# Patient Record
Sex: MALE | Race: WHITE
[De-identification: names, ages, dates, MRNs, and addresses within clinical notes are randomized per-mention and may not be internally consistent; named-entity substitution may affect disease eponyms.]

---

## 2018-01-12 ENCOUNTER — HOSPITAL ENCOUNTER (EMERGENCY)
Dept: HOSPITAL 45 - C.EDB | Age: 53
LOS: 1 days | Discharge: HOME | End: 2018-01-13
Payer: COMMERCIAL

## 2018-01-12 VITALS
HEIGHT: 72.01 IN | HEIGHT: 72.01 IN | BODY MASS INDEX: 34.73 KG/M2 | BODY MASS INDEX: 34.73 KG/M2 | WEIGHT: 256.4 LBS | WEIGHT: 256.4 LBS

## 2018-01-12 VITALS — TEMPERATURE: 97.88 F

## 2018-01-12 DIAGNOSIS — Z79.01: ICD-10-CM

## 2018-01-12 DIAGNOSIS — I82.432: Primary | ICD-10-CM

## 2018-01-12 DIAGNOSIS — Z79.899: ICD-10-CM

## 2018-01-13 VITALS — SYSTOLIC BLOOD PRESSURE: 132 MMHG | OXYGEN SATURATION: 97 % | HEART RATE: 78 BPM | DIASTOLIC BLOOD PRESSURE: 84 MMHG

## 2018-01-13 LAB
ALBUMIN SERPL-MCNC: 3.5 GM/DL (ref 3.4–5)
ALP SERPL-CCNC: 44 U/L (ref 45–117)
ALT SERPL-CCNC: 16 U/L (ref 12–78)
AST SERPL-CCNC: 12 U/L (ref 15–37)
BASOPHILS # BLD: 0.01 K/UL (ref 0–0.2)
BASOPHILS NFR BLD: 0.1 %
BUN SERPL-MCNC: 16 MG/DL (ref 7–18)
CALCIUM SERPL-MCNC: 8.8 MG/DL (ref 8.5–10.1)
CO2 SERPL-SCNC: 28 MMOL/L (ref 21–32)
CREAT SERPL-MCNC: 0.93 MG/DL (ref 0.6–1.4)
EOS ABS #: 0.1 K/UL (ref 0–0.5)
EOSINOPHIL NFR BLD AUTO: 179 K/UL (ref 130–400)
GLUCOSE SERPL-MCNC: 90 MG/DL (ref 70–99)
HCT VFR BLD CALC: 41.3 % (ref 42–52)
HGB BLD-MCNC: 14 G/DL (ref 14–18)
IG#: 0.02 K/UL (ref 0–0.02)
IMM GRANULOCYTES NFR BLD AUTO: 31.6 %
INR PPP: 2.3 (ref 0.9–1.1)
LYMPHOCYTES # BLD: 2.67 K/UL (ref 1.2–3.4)
MCH RBC QN AUTO: 31 PG (ref 25–34)
MCHC RBC AUTO-ENTMCNC: 33.9 G/DL (ref 32–36)
MCV RBC AUTO: 91.4 FL (ref 80–100)
MONO ABS #: 0.74 K/UL (ref 0.11–0.59)
MONOCYTES NFR BLD: 8.7 %
NEUT ABS #: 4.92 K/UL (ref 1.4–6.5)
NEUTROPHILS # BLD AUTO: 1.2 %
NEUTROPHILS NFR BLD AUTO: 58.2 %
PMV BLD AUTO: 10.1 FL (ref 7.4–10.4)
POTASSIUM SERPL-SCNC: 3.8 MMOL/L (ref 3.5–5.1)
PROT SERPL-MCNC: 6.3 GM/DL (ref 6.4–8.2)
RED CELL DISTRIBUTION WIDTH CV: 13.8 % (ref 11.5–14.5)
RED CELL DISTRIBUTION WIDTH SD: 45.4 FL (ref 36.4–46.3)
SODIUM SERPL-SCNC: 140 MMOL/L (ref 136–145)
WBC # BLD AUTO: 8.46 K/UL (ref 4.8–10.8)

## 2018-01-13 NOTE — DIAGNOSTIC IMAGING REPORT
L VENOUS DOPP LOWER EXT UNILAT



CLINICAL HISTORY: leg pain, behind knee, hx dvt    



TECHNIQUE: Venous Doppler



COMPARISON STUDY:  None



FINDINGS: Study is positive for deep venous thrombosis involving the left

popliteal vein. All remaining venous structures are unremarkable.



IMPRESSION:  Study is positive for nonocclusive thrombus within left popliteal

vein. 









The above report was generated using voice recognition software.  It may contain

grammatical, syntax or spelling errors.







Electronically signed by:  Andrés Chandlre M.D.

1/13/2018 5:41 AM



Dictated Date/Time:  1/13/2018 5:41 AM

## 2018-01-13 NOTE — EMERGENCY ROOM VISIT NOTE
History


Report prepared by Marcy:  Marry Arenas


Under the Supervision of:  Dr. Viviane Valdovinos D.O.


First contact with patient:  23:03


Chief Complaint:  LEG PAIN,LEG INJURY


Stated Complaint:  PAIN IN LEFT LEG,HAVE HAD DVT





History of Present Illness


The patient is a 52 year old male who presents to the Emergency Room with 

complaints of persistent left lower leg pain starting today. He describes the 

pain as throbbing. He rates his discomfort as a 7/10 at worst. He reports some 

pain in his right leg. He denies any fever, chills, chest pain, palpitations, 

shortness of breath, cough, rash, or sores. He denies any recent illness. He 

has had 2 DVT in the past. He is currently on Warfarin. He has been told that 

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 His INR this morning was 2.4. He 

works as a  in a high school. He denies any increased activity or 

heavy lifting. He has not been wearing new footwear. He notes he was wearing 

compression stockings today.





   Source of History:  patient


   Onset:  today


   Position:  leg (left)


   Symptom Intensity:  7/10


   Quality:  other (throbbing)


   Timing:  other (persistent)


   Associated Symptoms:  No fevers, No chills, No cough, No rash


Note:


Pt reports some right leg pain.





Review of Systems


See HPI for pertinent positives & negatives. A total of 10 systems reviewed and 

were otherwise negative.





Past Medical & Surgical


Medical Problems:


(1) DVT (deep venous thrombosis)








Family History


No pertinent family history stated.





Social History


Smoking Status:  Never Smoker


Marital Status:  





Current/Historical Medications


Scheduled


Famotidine (Pepcid), 20 MG PO DAILY


Gabapentin (Neurontin), 300 MG PO HS


Warfarin Sodium (Coumadin), 5 MG PO 3XWK


Warfarin Sodium (Coumadin), 7.5 MG PO 4XWK





Scheduled PRN


Baclofen (Lioresal), 10 MG PO TID PRN for Muscle Spasms


Hydrocodone/Acetaminophen 7.5MG/325MG (Norco 7.5MG/325MG), 1 TAB PO Q6 PRN for 

Pain





Allergies


Coded Allergies:  


     No Known Allergies (Unverified , 1/13/18)





Physical Exam


Vital Signs











  Date Time  Temp Pulse Resp B/P (MAP) Pulse Ox O2 Delivery O2 Flow Rate FiO2


 


1/13/18 01:28  78 16 132/84 97   


 


1/13/18 00:17  71 16 131/76 96 Room Air  


 


1/12/18 22:47 36.6 77 18 132/91 96 Room Air  











Physical Exam


GENERAL: alert, well appearing, well nourished, no distress, non-toxic 


EYE EXAM: normal conjunctiva, PERRL and EOM's grossly intact


OROPHARYNX: edentulous. No exudate, no erythema, lips, buccal mucosa, and 

tongue normal and mucous membranes are moist


NECK: supple, no nuchal rigidity, no adenopathy, non-tender


LUNGS: Clear to auscultation. Normal chest wall mechanics


HEART: no murmurs, S1 normal and S2 normal 


ABDOMEN: abdomen soft, non-tender, normo-active bowel sounds, no masses, no 

rebound or guarding. 


BACK: Back is symmetrical on inspection and there is no deformity, no midline 

tenderness, no CVA tenderness. 


SKIN: no rashes and no bruising 


UPPER EXTREMITIES: upper extremities are grossly normal. 


LOWER EXTREMITIES: No pitting edema. No reproducible calf tenderness. Good 

pulses. 


NEURO EXAM: Normal sensorium, cranial nerves II-XII grossly intact, normal 

speech,  no gross weakness of arms, no gross weakness of legs.





Medical Decision & Procedures


ER Provider


Diagnostic Interpretation:


Radiology results have been interpreted by the [facility] radiologist and reviewed 

by me.





US Venous left lower extremity:


Positive for DVT. Nonocclusive thrombus in the left popliteal vein.





Laboratory Results


1/13/18 00:17








Red Blood Count 4.52, Mean Corpuscular Volume 91.4, Mean Corpuscular Hemoglobin 

31.0, Mean Corpuscular Hemoglobin Concent 33.9, Mean Platelet Volume 10.1, 

Neutrophils (%) (Auto) 58.2, Lymphocytes (%) (Auto) 31.6, Monocytes (%) (Auto) 

8.7, Eosinophils (%) (Auto) 1.2, Basophils (%) (Auto) 0.1, Neutrophils # (Auto) 

4.92, Lymphocytes # (Auto) 2.67, Monocytes # (Auto) 0.74, Eosinophils # (Auto) 

0.10, Basophils # (Auto) 0.01





1/13/18 00:17

















Test


  1/13/18


00:17


 


White Blood Count


  8.46 K/uL


(4.8-10.8)


 


Red Blood Count


  4.52 M/uL


(4.7-6.1)


 


Hemoglobin


  14.0 g/dL


(14.0-18.0)


 


Hematocrit 41.3 % (42-52) 


 


Mean Corpuscular Volume


  91.4 fL


()


 


Mean Corpuscular Hemoglobin


  31.0 pg


(25-34)


 


Mean Corpuscular Hemoglobin


Concent 33.9 g/dl


(32-36)


 


Platelet Count


  179 K/uL


(130-400)


 


Mean Platelet Volume


  10.1 fL


(7.4-10.4)


 


Neutrophils (%) (Auto) 58.2 % 


 


Lymphocytes (%) (Auto) 31.6 % 


 


Monocytes (%) (Auto) 8.7 % 


 


Eosinophils (%) (Auto) 1.2 % 


 


Basophils (%) (Auto) 0.1 % 


 


Neutrophils # (Auto)


  4.92 K/uL


(1.4-6.5)


 


Lymphocytes # (Auto)


  2.67 K/uL


(1.2-3.4)


 


Monocytes # (Auto)


  0.74 K/uL


(0.11-0.59)


 


Eosinophils # (Auto)


  0.10 K/uL


(0-0.5)


 


Basophils # (Auto)


  0.01 K/uL


(0-0.2)


 


RDW Standard Deviation


  45.4 fL


(36.4-46.3)


 


RDW Coefficient of Variation


  13.8 %


(11.5-14.5)


 


Immature Granulocyte % (Auto) 0.2 % 


 


Immature Granulocyte # (Auto)


  0.02 K/uL


(0.00-0.02)


 


Prothrombin Time


  23.4 SECONDS


(9.0-12.0)


 


Prothromb Time International


Ratio 2.3 (0.9-1.1) 


 


 


Anion Gap


  4.0 mmol/L


(3-11)


 


Est Creatinine Clear Calc


Drug Dose 123.7 ml/min 


 


 


Estimated GFR (


American) 109.8 


 


 


Estimated GFR (Non-


American 94.7 


 


 


BUN/Creatinine Ratio 17.5 (10-20) 


 


Calcium Level


  8.8 mg/dl


(8.5-10.1)


 


Total Bilirubin


  0.4 mg/dl


(0.2-1)


 


Aspartate Amino Transf


(AST/SGOT) 12 U/L (15-37) 


 


 


Alanine Aminotransferase


(ALT/SGPT) 16 U/L (12-78) 


 


 


Alkaline Phosphatase


  44 U/L


()


 


Total Protein


  6.3 gm/dl


(6.4-8.2)


 


Albumin


  3.5 gm/dl


(3.4-5.0)


 


Globulin


  2.8 gm/dl


(2.5-4.0)


 


Albumin/Globulin Ratio 1.3 (0.9-2) 





Laboratory results per my review.





Medications Administered











 Medications


  (Trade)  Dose


 Ordered  Sig/Josué


 Route  Start Time


 Stop Time Status Last Admin


Dose Admin


 


 Acetaminophen


  (Tylenol Tab)  1,000 mg  NOW  [facility]


 PO  1/13/18 00:05


 1/13/18 00:06 DC 1/13/18 00:18


1,000 MG











ED Course


2306: The patient was evaluated in room A2. A complete history and physical 

exam was performed. 





0005: Acetaminophen 1000 mg PO. 





0022: I reevaluated the patient. I updated him on the results.





0057: I reevaluated the patient. I discussed the findings and the treatment 

plan with the patient.  He verbalizes agreement and understanding.  He was 

discharged home.





Medical Decision


Differential diagnosis:


Etiologies such as DVT, musculoskeletal, infection, joint effusion, trauma, 

lymphedema, idiopathic, CHF, as well as others were entertained..





Patient made aware of all results.  Patient already on Coumadin with 

therapeutic INR.  Patient with no other symptoms to suggest propagation of clot 

more proximally, no symptoms to suggest PE.  Discussed with patient [facility], discussion of his Coumadin, as well as recurrence of DVTs.  

Unclear if this is related to prior DVT versus a new acute finding.  Discussed 

with him symptoms to watch and return for, continued use of his anticoagulation

, he verbalized understanding was agreeable with plan.  No other evidence of 

physical exam or by history to suggest septic arthritis, trauma.  No Baker's 

cyst noted on Doppler.  No evidence of concurrent infection.





Medication Reconcilliation


Current Medication List:  was personally reviewed by me





Blood Pressure Screening


Patient's blood pressure:  Elevated blood pressure


Blood pressure disposition:  Elevated BP felt to be situational





Impression





 Primary Impression:  


 Deep vein thrombosis


 Additional Impression:  


 Leg pain, left





Scribe Attestation


The scribe's documentation has been prepared under my direction and personally 

reviewed by me in its entirety. I confirm that the note above accurately 

reflects all work, treatment, procedures, and medical decision making performed 

by me.





Departure Information


Dispostion


Home / Self-Care





Referrals


Irvin Vanessa M.D. (PCP)





Patient Instructions


My [facility]





Additional Instructions





Please call and follow-up with your [facility] as soon as possible.  Your INR 

tonight was 2.3.  If you have any increased leg pain or swelling, develop 

discoloration of the leg, develop chest pain, palpitations, trouble breathing/

shortness of breath, dizziness, fevers, or you have any other new or concerning 

symptoms, please return to the emergency room.





Problem Qualifiers








 Primary Impression:  


 Deep vein thrombosis


 DVT location:  lower extremity  Affected thrombotic vein of extremity:  

popliteal  Chronicity:  unspecified  Laterality:  left  Qualified Codes:  

I82.432 - Acute embolism and thrombosis of left popliteal vein